# Patient Record
Sex: FEMALE | Race: WHITE | NOT HISPANIC OR LATINO | Employment: FULL TIME | URBAN - METROPOLITAN AREA
[De-identification: names, ages, dates, MRNs, and addresses within clinical notes are randomized per-mention and may not be internally consistent; named-entity substitution may affect disease eponyms.]

---

## 2020-11-13 ENCOUNTER — TRANSFERRED RECORDS (OUTPATIENT)
Dept: HEALTH INFORMATION MANAGEMENT | Facility: CLINIC | Age: 40
End: 2020-11-13

## 2020-11-23 ENCOUNTER — TRANSFERRED RECORDS (OUTPATIENT)
Dept: HEALTH INFORMATION MANAGEMENT | Facility: CLINIC | Age: 40
End: 2020-11-23

## 2020-11-24 ENCOUNTER — TRANSFERRED RECORDS (OUTPATIENT)
Dept: HEALTH INFORMATION MANAGEMENT | Facility: CLINIC | Age: 40
End: 2020-11-24

## 2020-12-02 ENCOUNTER — TRANSFERRED RECORDS (OUTPATIENT)
Dept: HEALTH INFORMATION MANAGEMENT | Facility: CLINIC | Age: 40
End: 2020-12-02

## 2020-12-03 LAB
ALT SERPL-CCNC: 15 IU/L (ref 6–31)
AST SERPL-CCNC: 23 IU/L (ref 10–40)
HBA1C MFR BLD: 6.5 % (ref 4–5.6)
HEP C HIM: NORMAL

## 2020-12-07 ENCOUNTER — TRANSFERRED RECORDS (OUTPATIENT)
Dept: HEALTH INFORMATION MANAGEMENT | Facility: CLINIC | Age: 40
End: 2020-12-07

## 2021-01-15 ENCOUNTER — TRANSFERRED RECORDS (OUTPATIENT)
Dept: HEALTH INFORMATION MANAGEMENT | Facility: CLINIC | Age: 41
End: 2021-01-15

## 2021-01-15 ENCOUNTER — MEDICAL CORRESPONDENCE (OUTPATIENT)
Dept: HEALTH INFORMATION MANAGEMENT | Facility: CLINIC | Age: 41
End: 2021-01-15

## 2021-01-27 ENCOUNTER — TRANSFERRED RECORDS (OUTPATIENT)
Dept: HEALTH INFORMATION MANAGEMENT | Facility: CLINIC | Age: 41
End: 2021-01-27

## 2021-01-29 ENCOUNTER — REFERRAL (OUTPATIENT)
Dept: TRANSPLANT | Facility: CLINIC | Age: 41
End: 2021-01-29

## 2021-01-29 DIAGNOSIS — R16.0 LIVER MASS: Primary | ICD-10-CM

## 2021-01-29 NOTE — LETTER
PHYSICIAN ORDERS    DATE & TIME ISSUED: February 10, 90901:55 PM  PATIENT NAME: Jodi Bobo   : 1980     [facility] MR# : [id number]     DIAGNOSIS:  MARTE  ICD-10 CODE: K76.0   Orders  1 year after issue.      Please call and schedule patient with Dr. Cantu consult during his next trip up to the [facility] in [place].    Thank you and please call Luisito Miranda RN at [phone] with any questions.     .

## 2021-01-29 NOTE — LETTER
February 10, 2021    Jodi Bobo  [address]  [facility] [address]    Dear MsBrandi Jeramie,   The purpose of this letter is to let you know that on  the [facility] Multi-Disciplinary tumor committee reviewed the results of your recent MRI done in [place]. Based on the results of this review the team feels that we will request a consult with Dr. Cantu on his next visit to [place]. 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   Important things you should know:    If you would like to discuss the decision, or if your medical status changes you may schedule a return visits with your doctor by calling [phone] and asking to speak to your transplant coordinator.    We recommend that you continue to follow up with your primary care doctor & [facility] in order to manage your health concerns.  Enclosed is a letter from [facility] which describes the services offered to patients by [facility] and the Organ Procurement and Transplantation Network.  Thank you for allowing us to participate in your care.  We wish you well.  Sincerely,    Aristeo Miranda Jr., BSN, RN  Liver Transplant Coordinator  [phone]    Enclosures: [facility] Letter  cc: Care Team            The Organ Procurement and Transplantation Network  Toll-free patient services line:     Your resource for organ transplant information    If you have a question regarding your own medical care, you always should call your transplant hospital first. However, for general organ transplant-related information, you can call the Organ Procurement and Transplantation Network (OPTN) toll-free patient services line at [phone]- [phone]. Anyone, including potential transplant candidates, candidates, recipients, family members, friends, living donors, and donor family members, can call this number to:          Talk about organ donation, living donation, the transplant process, the donation  process, and transplant policies.    Get a free patient information kit with helpful booklets, waiting list and transplant information, and a list of all transplant hospitals.    Ask questions about the OPTN website (https://optn.transplant.hrsa.gov/), the United Network for Organ Sharing s (UNOS) website (https://unos.org/), or the UNOS website for living donors and transplant recipients. (https://www.transplantliving.org/).    Learn how the OPTN can help you.    Talk about any concerns that you may have with a transplant hospital.    The [facility] transplant system, the OPTN, is managed under federal contract by the United Network for Organ Sharing (UNOS), which is a non-profit charitable organization. The OPTN helps create and define organ sharing policies that make the best use of donated organs. This process continuously evaluating new advances and discoveries so policies can be adapted to best serve patients waiting for transplants. To do so, the OPTN works closely with transplant professionals, transplant patients, transplant candidates, donor families, living donors, and the public. All transplant programs and organ procurement organizations throughout the country are OPTN members and are obligated to follow the policies the OPTN creates for allocating organs.    The OPTN also is responsible for:      Providing educational material for patients, the public, and professionals.    Raising awareness of the need for donated organs and tissue.    Coordinating organ procurement, matching, and placement.    Collecting information about every organ transplant and donation that occurs in the [place].    Remember, you should contact your transplant hospital directly if you have questions or concerns about your own medical care including medical records, work-up progress, and test results.    We are not your transplant hospital, and our staff will not be able to answer questions about your case, so please keep  your transplant hospital s phone number handy.    However, while you research your transplant needs and learn as much as you can about transplantation and donation, we welcome your call to our toll-free patient services line at [phone].          Updated 4/1/2019

## 2021-02-01 VITALS — BODY MASS INDEX: 41.41 KG/M2 | HEIGHT: 62 IN | WEIGHT: 225 LBS

## 2021-02-01 PROBLEM — E66.812 CLASS 2 OBESITY DUE TO EXCESS CALORIES WITH BODY MASS INDEX (BMI) OF 35.0 TO 35.9 IN ADULT: Status: ACTIVE | Noted: 2017-09-06

## 2021-02-01 PROBLEM — Z80.0 FH: PANCREATIC CANCER: Status: ACTIVE | Noted: 2020-11-27

## 2021-02-01 PROBLEM — E66.09 CLASS 2 OBESITY DUE TO EXCESS CALORIES WITH BODY MASS INDEX (BMI) OF 35.0 TO 35.9 IN ADULT: Status: ACTIVE | Noted: 2017-09-06

## 2021-02-01 PROBLEM — K76.0 FATTY LIVER: Status: ACTIVE | Noted: 2020-11-27

## 2021-02-01 SDOH — HEALTH STABILITY: MENTAL HEALTH: HOW OFTEN DO YOU HAVE A DRINK CONTAINING ALCOHOL?: NEVER

## 2021-02-01 SDOH — HEALTH STABILITY: MENTAL HEALTH: HOW MANY STANDARD DRINKS CONTAINING ALCOHOL DO YOU HAVE ON A TYPICAL DAY?: NOT ASKED

## 2021-02-01 SDOH — HEALTH STABILITY: MENTAL HEALTH: HOW OFTEN DO YOU HAVE 6 OR MORE DRINKS ON ONE OCCASION?: NEVER

## 2021-02-01 ASSESSMENT — MIFFLIN-ST. JEOR: SCORE: 1643.84

## 2021-02-01 NOTE — TELEPHONE ENCOUNTER
Patient called regarding referral for transplant from Dr. Anupam Lee. Patient verbalized that she is not in need of a liver transplant. Did receive referral order for transplant from Dr. Anupam Lee on 1/27/21. Intake completed. Insurance info obtained.   Routed to [facility] for review.     CHINEDU Palm, LPN   Solid Organ Transplant

## 2021-02-04 NOTE — TELEPHONE ENCOUNTER
Works for [employer]     Mom passed a year from pancreatic cancer    - Dx with fatty liver on 11/13/21  - recently dx with DM due to A1C of 6.5 in December 2020  - chronic positional vertigo    Off birth control last Thanksgiving      Plan: review at tumor conf to determine liver lesions and plan    Does NOT need txp referral/eval at this time.

## 2021-02-05 ENCOUNTER — DOCUMENTATION ONLY (OUTPATIENT)
Dept: TRANSPLANT | Facility: CLINIC | Age: 41
End: 2021-02-05

## 2021-02-05 NOTE — PROGRESS NOTES
MRI 11/24/20 done at [facility]  1.) hemiangoma's     Recs:  - no referral to [facility]  - see Dr. Cantu in [place]

## 2021-02-10 NOTE — TELEPHONE ENCOUNTER
Patient discussed at tumor conf last Friday (see encounter) and determined lesions are hemiangiomas.    Plan will be to request consult with Dr. Cantu in [place] at [facility] on his next trip up.    No need to come down to [facility] per committee.

## 2021-03-10 ENCOUNTER — TRANSFERRED RECORDS (OUTPATIENT)
Dept: HEALTH INFORMATION MANAGEMENT | Facility: CLINIC | Age: 41
End: 2021-03-10